# Patient Record
Sex: MALE | Race: WHITE | NOT HISPANIC OR LATINO | URBAN - NONMETROPOLITAN AREA
[De-identification: names, ages, dates, MRNs, and addresses within clinical notes are randomized per-mention and may not be internally consistent; named-entity substitution may affect disease eponyms.]

---

## 2022-10-03 ENCOUNTER — OFFICE VISIT (OUTPATIENT)
Dept: CARDIOLOGY | Facility: CLINIC | Age: 27
End: 2022-10-03

## 2022-10-03 VITALS
OXYGEN SATURATION: 99 % | HEIGHT: 71 IN | WEIGHT: 213.4 LBS | HEART RATE: 84 BPM | SYSTOLIC BLOOD PRESSURE: 136 MMHG | BODY MASS INDEX: 29.88 KG/M2 | DIASTOLIC BLOOD PRESSURE: 84 MMHG

## 2022-10-03 DIAGNOSIS — R07.9 CHEST PAIN, UNSPECIFIED TYPE: ICD-10-CM

## 2022-10-03 DIAGNOSIS — R07.2 PRECORDIAL PAIN: Primary | ICD-10-CM

## 2022-10-03 DIAGNOSIS — R00.2 PALPITATIONS: Primary | ICD-10-CM

## 2022-10-03 DIAGNOSIS — R00.2 PALPITATIONS: ICD-10-CM

## 2022-10-03 PROCEDURE — 99204 OFFICE O/P NEW MOD 45 MIN: CPT | Performed by: NURSE PRACTITIONER

## 2022-10-03 PROCEDURE — 93000 ELECTROCARDIOGRAM COMPLETE: CPT | Performed by: INTERNAL MEDICINE

## 2022-10-03 NOTE — PROGRESS NOTES
Chest Pain and Palpitations      History of Present Illness    Mr. Avery Pickens (Elvin) is a 27-year-old male with medical history of ADHD, chronic tobacco use who presents today for evaluation of intermittent chest pain and palpitations.  Patient describes the past several months intermittent episodes where he will have chest discomfort, heart racing with associated symptoms of dizziness.  Patient has an iWatch and will note heart rate is elevated as high as 180 to 200 bpm.  Symptoms occur at rest and during exertion.  No clear worsening component.  He has cut back on caffeine which seems to have helped some.  He does drink alcohol on the weekends approximately 1 pint of vodka.  Denies any other recreational drug use except for THC.    Patient denies any prior history of cardiac problems or previous cardiac work-up.  Denies any known history of early CAD in family, DVT, PE, stroke.        Cardiac Risk Factors:  smoking      History reviewed. No pertinent past medical history.  Past Surgical History:   Procedure Laterality Date   • EYE SURGERY       Social History     Socioeconomic History   • Marital status:    Tobacco Use   • Smoking status: Current Every Day Smoker   • Smokeless tobacco: Never Used   • Tobacco comment: vapes   Substance and Sexual Activity   • Alcohol use: Yes     Comment: once a week   • Drug use: Yes     Types: Marijuana     Family History   Problem Relation Age of Onset   • Cancer Mother    • Cancer Father        ALLERGIES:  No Known Allergies      Review of Systems   Constitutional: Negative for chills, fever, malaise/fatigue and weight gain.   HENT: Negative for nosebleeds and tinnitus.    Eyes: Negative for blurred vision and double vision.   Cardiovascular: Positive for chest pain, irregular heartbeat and palpitations. Negative for dyspnea on exertion, leg swelling and syncope.   Respiratory: Negative for cough, shortness of breath, sleep disturbances due to breathing and snoring.     Endocrine: Negative for polydipsia, polyphagia and polyuria.   Hematologic/Lymphatic: Negative for bleeding problem. Does not bruise/bleed easily.   Skin: Negative for color change and suspicious lesions.   Musculoskeletal: Negative for falls and myalgias.   Gastrointestinal: Negative for bloating, heartburn and hematochezia.   Genitourinary: Negative for dysuria and hematuria.   Neurological: Positive for dizziness. Negative for headaches, seizures, vertigo and weakness.   Psychiatric/Behavioral: Negative for altered mental status and depression. The patient does not have insomnia and is not nervous/anxious.    Allergic/Immunologic: Negative for environmental allergies and persistent infections.       No current outpatient medications on file.     No current facility-administered medications for this visit.       OBJECTIVE:    Physical Exam:   Vitals reviewed.   Constitutional:       General: Not in acute distress.     Appearance: Normal appearance. Well-developed. Not toxic-appearing or diaphoretic.   Eyes:      General: Lids are normal.      Conjunctiva/sclera: Conjunctivae normal.   HENT:      Head: Normocephalic and atraumatic.      Right Ear: External ear normal.      Left Ear: External ear normal.   Neck:      Vascular: No JVD.   Pulmonary:      Effort: Pulmonary effort is normal. No respiratory distress.      Breath sounds: Normal breath sounds. No decreased breath sounds. No wheezing. No rales.   Chest:      Chest wall: Not tender to palpatation.   Cardiovascular:      Normal rate. Regular rhythm.      No gallop. No S3 and S4 gallop.   Pulses:     Intact distal pulses. No decreased pulses.   Abdominal:      General: Bowel sounds are normal. There is no distension.      Palpations: Abdomen is soft.      Tenderness: There is no abdominal tenderness.   Musculoskeletal:      Cervical back: Normal range of motion and neck supple. Skin:     General: Skin is warm and dry.      Coloration: Skin is not pale.       "Findings: No erythema or rash.   Neurological:      Mental Status: Alert and oriented to person, place, and time.      Gait: Gait normal.   Psychiatric:         Behavior: Behavior normal.         Thought Content: Thought content normal.         Judgment: Judgment normal.       Vitals:    10/03/22 0957   BP: 136/84   BP Location: Left arm   Patient Position: Sitting   Cuff Size: Adult   Pulse: 84   SpO2: 99%   Weight: 96.8 kg (213 lb 6.4 oz)   Height: 180.3 cm (71\")       DATA REVIEWED:       No radiology results for the last 30 days.       Labs: BMP, CBC, LIPID, TSH  No results found for: GLUCOSE, CALCIUM, NA, K, CO2, CL, BUN, CREATININE, EGFRIFAFRI, EGFRIFNONA, BCR, ANIONGAP  No results found for: WBC, HGB, HCT, MCV, PLT  No results found for: CHOL  No results found for: TRIG  No results found for: HDL  No components found for: LDLCALC  No results found for: LDL  No results found for: HDLLDLRATIO  No components found for: CHOLHDL  No results found for: TSH  No results found for: PROBNP      EKG:           The following portions of the patient's history were reviewed and updated as appropriate: allergies, current medications, past family history, past medical history, past social history, past surgical history and problem list.  Old records reviewed and pertinent information is included in the above objective data.     ASSESSMENT/PLAN:       Diagnosis Plan   1. Precordial pain  ECG 12 Lead    Treadmill Stress Test    Adult Transthoracic Echo Complete w/ Color, Spectral and Contrast if Necessary Per Protocol   2. Palpitations  ECG 12 Lead    Treadmill Stress Test    Adult Transthoracic Echo Complete w/ Color, Spectral and Contrast if Necessary Per Protocol     #1. Chest pain syndrome. Pain characteristic: atypical angina   Patient is Low Risk.     Ischemic evaluation:ordered.   The patient is able to exercise.  EKG is Normal  Risks/Benefits discussed  Ischemia evaluation with Treadmill Stress Test to exclude exercise " induced arrhythmias and exertional HTN    A TST was recommended to the patient as the best non-invasive testing for obstructive CAD.  Risks and benefits were discussed.  Specifically, the patient was informed that they would need to obtain an expected heart rate and exercise for an expected time.  They were also informed that these variables are combined with EKG data to calculate a DTS.  I did inform them that if they were unable to complete the study that we would discuss transitioning to a MPS/  I also informed them that if they were able to  complete the study that results are not dichotomous: Negative or positive.  In fact, I spent some time explaining that the results can come back one of three ways: 1.) low-risk; 2.) moderate-risk; and 3.). High-risk.  I did explain to the patient that low risk treadmill stress tests portend a good cardiovascular prognosis, though not perfect.  In this situation, we would continue with risk factor modifications.  A moderate-risk treadmill necessitates additional information that would be gathered by a  myocardial perfusion stress.  A high-risk treadmill would elicit conversations about invasive coronary angiography.       TTE to assess for structural HD and RWMAS   Basic Labs, PA/LA CXR (results reviewed if completed)    Reasons for non-cardiac chest pain:  Pneumonia  GERD  Esophageal Spasm  Musculoskeletal pain  Peptic Ulcer Disease  Cholecystitis  Pancreatitis  Symptomatic anemia  Vasoactive Drug Use  Anxiety  Mediastinitis     #2. Palpitations, uncertain etiology at this time, warranting further investigation.   Discussed the benign nature of the majority of causes of palpitations.   Discussed lifestyle modifications including reducing caffeine intake, reducing stress, and increasing exercise tolerance.  Reassurance given to patient.    TTE to evaluate for structural heart disease,  Holter monitor    Christoferjonas Pickens  reports that he has been smoking. He has never used  smokeless tobacco.. I have educated him on the risk of diseases from using tobacco products such as cancer, COPD and heart disease.     I advised him to quit and he is not willing to quit.    I spent 3.5 minutes counseling the patient.       I spent 40 minutes caring for Christofer on this date of service. This time includes time spent by me in the following activities: preparing for the visit, reviewing tests, obtaining and/or reviewing a separately obtained history, performing a medically appropriate examination and/or evaluation, counseling and educating the patient/family/caregiver, ordering medications, tests, or procedures, referring and communicating with other health care professionals, documenting information in the medical record, independently interpreting results and communicating that information with the patient/family/caregiver and care coordination                    This document has been electronically signed by LAVERN Cedeño on October 3, 2022 12:39 CDT

## 2022-10-04 LAB
QT INTERVAL: 362 MS
QTC INTERVAL: 427 MS

## 2022-10-06 ENCOUNTER — PATIENT ROUNDING (BHMG ONLY) (OUTPATIENT)
Dept: CARDIOLOGY | Facility: CLINIC | Age: 27
End: 2022-10-06

## 2022-10-06 NOTE — PROGRESS NOTES
October 6, 2022    Hello, may I speak with Christofer Pickesn?    My name is LORRI Mendoza      I am  with [facility]  [address] [place] [address] [phone].    Before we get started may I verify your date of birth? 1995    I am calling to officially welcome you to our practice and ask about your recent visit. Is this a good time to talk? yes    Tell me about your visit with us. What things went well?  Everything. She seemed very concerned about what was going on.        We're always looking for ways to make our patients' experiences even better. Do you have recommendations on ways we may improve?  no    Overall were you satisfied with your first visit to our practice? yes       I appreciate you taking the time to speak with me today. Is there anything else I can do for you? no      Thank you, and have a great day.

## 2022-10-26 ENCOUNTER — TELEPHONE (OUTPATIENT)
Dept: CARDIOLOGY | Facility: CLINIC | Age: 27
End: 2022-10-26

## 2022-10-26 NOTE — TELEPHONE ENCOUNTER
Contacted Patient per Brandee Frank about monitor results. Patient voiced understanding.      ----- Message from LAVERN Cedeño sent at 10/25/2022  3:22 PM CDT -----  Holter monitor showing normal study.

## 2022-12-12 ENCOUNTER — OFFICE VISIT (OUTPATIENT)
Dept: CARDIOLOGY | Facility: CLINIC | Age: 27
End: 2022-12-12

## 2022-12-12 VITALS
SYSTOLIC BLOOD PRESSURE: 142 MMHG | WEIGHT: 208.4 LBS | DIASTOLIC BLOOD PRESSURE: 86 MMHG | OXYGEN SATURATION: 97 % | HEART RATE: 92 BPM | BODY MASS INDEX: 29.18 KG/M2 | HEIGHT: 71 IN

## 2022-12-12 DIAGNOSIS — R07.2 PRECORDIAL PAIN: Primary | ICD-10-CM

## 2022-12-12 DIAGNOSIS — R00.2 PALPITATIONS: ICD-10-CM

## 2022-12-12 PROCEDURE — 99213 OFFICE O/P EST LOW 20 MIN: CPT | Performed by: NURSE PRACTITIONER

## 2022-12-12 NOTE — PROGRESS NOTES
Precordial pain      History of Present Illness    Mr. Avery Pickens (Elvin) is a 27-year-old male with medical history of ADHD, chronic tobacco use who resented for evaluation of chest pain and palpitations.  The last office visit he reported intermittent episodes where he would have chest discomfort, heart racing and associated dizziness.  He noted elevated heart rates on his iWatch.    In evaluation of his symptoms he underwent treadmill stress test, echocardiogram and Holter monitor.  Patient was advised to stop caffeine intake, adequate intake of fluids.  Since his last visit he denies any further symptoms or episodes.  He reports playing football and resuming normal activities with no problems.  Treadmill stress test showing patient exercised for 12 minutes and 35 seconds achieving 12.4 METs, Duke treadmill score of 12.6 is consistent with low risk, normal EKG treadmill stress test.  Echocardiogram showing normal LVEF of 57%, normal diastolic function, no significant valvular abnormalities.  Holter monitor was worn for almost 2 weeks.  Average heart rate at 77 bpm, normal burden PACs PVCs.  Max heart rate of 174 bpm and appears to be sinus tachycardia.       Cardiac Risk Factors:  smoking      History reviewed. No pertinent past medical history.  Past Surgical History:   Procedure Laterality Date   • EYE SURGERY       Social History     Socioeconomic History   • Marital status:    Tobacco Use   • Smoking status: Every Day   • Smokeless tobacco: Never   • Tobacco comments:     vapes   Substance and Sexual Activity   • Alcohol use: Yes     Comment: once a week   • Drug use: Yes     Types: Marijuana     Family History   Problem Relation Age of Onset   • Cancer Mother    • Cancer Father        ALLERGIES:  No Known Allergies      Review of Systems   Constitutional: Negative for chills, fever, malaise/fatigue and weight gain.   HENT: Negative for nosebleeds and tinnitus.    Eyes: Negative for blurred vision  and double vision.   Cardiovascular: Negative for chest pain, dyspnea on exertion, irregular heartbeat, leg swelling, palpitations and syncope.   Respiratory: Negative for cough, shortness of breath, sleep disturbances due to breathing and snoring.    Endocrine: Negative for polydipsia, polyphagia and polyuria.   Hematologic/Lymphatic: Negative for bleeding problem. Does not bruise/bleed easily.   Skin: Negative for color change and suspicious lesions.   Musculoskeletal: Negative for falls and myalgias.   Gastrointestinal: Negative for bloating, heartburn and hematochezia.   Genitourinary: Negative for dysuria and hematuria.   Neurological: Negative for dizziness, headaches, seizures, vertigo and weakness.   Psychiatric/Behavioral: Negative for altered mental status and depression. The patient does not have insomnia and is not nervous/anxious.    Allergic/Immunologic: Negative for environmental allergies and persistent infections.       No current outpatient medications on file.     No current facility-administered medications for this visit.       OBJECTIVE:    Physical Exam:   Vitals reviewed.   Constitutional:       General: Not in acute distress.     Appearance: Normal appearance. Well-developed. Not toxic-appearing or diaphoretic.   Eyes:      General: Lids are normal.      Conjunctiva/sclera: Conjunctivae normal.   HENT:      Head: Normocephalic and atraumatic.      Right Ear: External ear normal.      Left Ear: External ear normal.   Neck:      Vascular: No JVD.   Pulmonary:      Effort: Pulmonary effort is normal. No respiratory distress.      Breath sounds: Normal breath sounds. No decreased breath sounds. No wheezing. No rales.   Chest:      Chest wall: Not tender to palpatation.   Cardiovascular:      Normal rate. Regular rhythm.      No gallop. No S3 and S4 gallop.   Pulses:     Intact distal pulses. No decreased pulses.   Abdominal:      General: Bowel sounds are normal. There is no distension.       "Palpations: Abdomen is soft.      Tenderness: There is no abdominal tenderness.   Musculoskeletal:      Cervical back: Normal range of motion and neck supple. Skin:     General: Skin is warm and dry.      Coloration: Skin is not pale.      Findings: No erythema or rash.   Neurological:      Mental Status: Alert and oriented to person, place, and time.      Gait: Gait normal.   Psychiatric:         Behavior: Behavior normal.         Thought Content: Thought content normal.         Judgment: Judgment normal.       Vitals:    12/12/22 0922   BP: 142/86   BP Location: Left arm   Patient Position: Sitting   Cuff Size: Adult   Pulse: 92   SpO2: 97%   Weight: 94.5 kg (208 lb 6.4 oz)   Height: 180.3 cm (70.98\")       DATA REVIEWED:   Results for orders placed in visit on 12/05/22    Adult Transthoracic Echo Complete w/ Color, Spectral and Contrast if Necessary Per Protocol    Interpretation Summary  •  Calculated left ventricular EF = 57% Left ventricular ejection fraction appears to be 56 - 60%.  •  Left ventricular diastolic function was normal.  •  Estimated right ventricular systolic pressure from tricuspid regurgitation is normal (<35 mmHg).      No radiology results for the last 30 days.       Labs: BMP, CBC, LIPID, TSH  No results found for: GLUCOSE, CALCIUM, NA, K, CO2, CL, BUN, CREATININE, EGFRIFAFRI, EGFRIFNONA, BCR, ANIONGAP  No results found for: WBC, HGB, HCT, MCV, PLT  No results found for: CHOL  No results found for: TRIG  No results found for: HDL  No components found for: LDLCALC  No results found for: LDL  No results found for: HDLLDLRATIO  No components found for: CHOLHDL  No results found for: TSH  No results found for: PROBNP      EKG:       TST:   •  The patient exercised on KRISTINA protocol for 12:35 minutes, achieving a workload of 12.8 METS. Target HR was achieved.  •  No ECG evidence of myocardial ischemia.  •  The Duke Treadmill Score of 12.6 is consistent with a Low risk for ischemic heart " disease.      HOLTER:      An abnormal monitor study.  •  The predominant rhythm noted during the testing period was sinus rhythm.  •  Rare PACs were noted.  •  One reported episode of lightheadedness correlated with sinus tachycardia.  •  At peak HR of 174 bpm, underlying rhythm appears to be sinus tachycardia, underlying SVT cannot be entirely excluded.  •  Some patient triggered events correlated with sinus bradycardia in the evening hours.  •  Some episodes of significant sinus bradycardia (HR in 40 bpm range) were noted during sleep hours.             The following portions of the patient's history were reviewed and updated as appropriate: allergies, current medications, past family history, past medical history, past social history, past surgical history and problem list.  Old records reviewed and pertinent information is included in the above objective data.     ASSESSMENT/PLAN:       Diagnosis Plan   1. Precordial pain        2. Palpitations          #1. Precordial pain: Resolved.  -Treadmill stress test showing patient exercised for 12 minutes and 35 seconds achieving 12.4 METs, Duke treadmill score of 12.6 is consistent with low risk, normal EKG treadmill stress test.    -Echocardiogram showing normal LVEF of 57%, normal diastolic function, no significant valvular abnormalities.    -Holter monitor was worn for almost 2 weeks.  Average heart rate at 77 bpm, normal burden PACs PVCs.  Max heart rate of 174 bpm and appears to be sinus tachycardia.       #2. Palpitations: resolved.   Discussed the benign nature of the majority of causes of palpitations.   Discussed lifestyle modifications including reducing caffeine intake, reducing stress, and increasing exercise tolerance.  Reassurance given to patient.    -TTE unremarkable  -Holter monitor was worn for almost 2 weeks.  Average heart rate at 77 bpm, normal burden PACs PVCs.  Max heart rate of 174 bpm and appears to be sinus tachycardia. Heart rate normal  today in office. We discussed avoiding caffeine and increasing exercise to lower resting heart rate.     Christofer Pickens  reports that he has been smoking. He has never used smokeless tobacco.. I have educated him on the risk of diseases from using tobacco products such as cancer, COPD and heart disease.     I advised him to quit and he is not willing to quit.    I spent 3.5 minutes counseling the patient.       I spent 20 minutes caring for Christofer on this date of service. This time includes time spent by me in the following activities: preparing for the visit, reviewing tests, obtaining and/or reviewing a separately obtained history, performing a medically appropriate examination and/or evaluation, counseling and educating the patient/family/caregiver, ordering medications, tests, or procedures and documenting information in the medical record    Follow up: 3 months            This document has been electronically signed by LAVERN Cedeño on December 12, 2022 09:55 CST